# Patient Record
Sex: FEMALE | Race: WHITE | Employment: PART TIME | URBAN - METROPOLITAN AREA
[De-identification: names, ages, dates, MRNs, and addresses within clinical notes are randomized per-mention and may not be internally consistent; named-entity substitution may affect disease eponyms.]

---
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
## 2022-08-02 ENCOUNTER — DOCUMENTATION ONLY (OUTPATIENT)
Dept: FAMILY MEDICINE CLINIC | Age: 22
End: 2022-08-02

## 2022-09-01 ENCOUNTER — DOCUMENTATION ONLY (OUTPATIENT)
Dept: FAMILY MEDICINE CLINIC | Age: 22
End: 2022-09-01

## 2023-05-17 RX ORDER — PROMETHAZINE HYDROCHLORIDE 25 MG/1
25 TABLET ORAL EVERY 6 HOURS PRN
COMMUNITY
Start: 2017-02-20

## 2023-05-17 RX ORDER — ACETAMINOPHEN 325 MG/1
TABLET ORAL EVERY 4 HOURS PRN
COMMUNITY

## 2023-05-17 RX ORDER — IBUPROFEN 600 MG/1
600 TABLET ORAL EVERY 8 HOURS PRN
COMMUNITY
Start: 2014-11-13

## 2023-05-17 RX ORDER — ESCITALOPRAM OXALATE 10 MG/1
10 TABLET ORAL DAILY
COMMUNITY
Start: 2019-08-06

## 2023-05-17 RX ORDER — ONDANSETRON 4 MG/1
4 TABLET, ORALLY DISINTEGRATING ORAL EVERY 8 HOURS PRN
COMMUNITY
Start: 2018-12-11

## 2023-10-22 NOTE — ED NOTES
Diabetic Diet  ------------------------------------------------------------------------------------------------------------------------------------------------------------------  A High Calorie/High Protein diet is recommended to meet your nutritional needs:  Recommend taking two (2) protein shakes daily for one month following discharge from hospital for muscle preservation and healing promotion. Protein shake should contain at least 18 grams protein per shake or more.   Consider an unflavored protein powder (Vital Protein, [employer], Isopure or similar store-brand powders) dissolved into hot/warm fluids like tea/coffee, broth, soups, etc.  Consider a high protein/high calorie shake (Ensure Complete, Ensure Plus, Boost High Protein, Boost Very High Calorie or store brand equivalents)    Aim to eat 4-6 smaller, protein-containing meals/snacks daily if you have a smaller appetite to encourage calories and protein throughout the day. Protein sources include eggs, chicken/turkey, fish, lean meats, dairy (if tolerated) - Greek yogurt has more protein than regular yogurt, nuts/nut butters, etc.    A dietitian can help personalize your nutrition plan if you are on a special diet or have difficulty swallowing.    Nutrition-Related Questions: Ivonne LERMA (Dietitian)  Phone: [phone]    Pt is resting on stretcher with fluids infusing. Family remains at bedside. Call bell within reach.

## 2024-10-03 ENCOUNTER — OFFICE VISIT (OUTPATIENT)
Age: 24
End: 2024-10-03

## 2024-10-03 VITALS
SYSTOLIC BLOOD PRESSURE: 110 MMHG | RESPIRATION RATE: 16 BRPM | HEART RATE: 80 BPM | BODY MASS INDEX: 28.93 KG/M2 | HEIGHT: 66 IN | DIASTOLIC BLOOD PRESSURE: 70 MMHG | WEIGHT: 180 LBS | OXYGEN SATURATION: 98 % | TEMPERATURE: 98.6 F

## 2024-10-03 DIAGNOSIS — S99.912A INJURY OF LEFT ANKLE, INITIAL ENCOUNTER: Primary | ICD-10-CM

## 2024-10-03 NOTE — PROGRESS NOTES
Height: 1.676 m (5' 6\")         Physical Exam  Vitals and nursing note reviewed.   Constitutional:       General: She is not in acute distress.     Appearance: Normal appearance. She is normal weight. She is not ill-appearing, toxic-appearing or diaphoretic.   HENT:      Head: Normocephalic.   Cardiovascular:      Rate and Rhythm: Normal rate.      Pulses: Normal pulses.   Pulmonary:      Effort: Pulmonary effort is normal.   Musculoskeletal:         General: Swelling, tenderness and signs of injury present.      Comments: Decreased ROM left foot and ankle, unable to bear full weight   Neurological:      Mental Status: She is alert.           An electronic signature was used to authenticate this note.    Maximo Quinones, JOSEFA - CNP